# Patient Record
Sex: MALE | Race: BLACK OR AFRICAN AMERICAN | Employment: FULL TIME | URBAN - METROPOLITAN AREA
[De-identification: names, ages, dates, MRNs, and addresses within clinical notes are randomized per-mention and may not be internally consistent; named-entity substitution may affect disease eponyms.]

---

## 2024-07-28 ENCOUNTER — HOSPITAL ENCOUNTER (EMERGENCY)
Age: 39
Discharge: HOME | End: 2024-07-28
Attending: EMERGENCY MEDICINE

## 2024-07-28 VITALS
WEIGHT: 235 LBS | HEART RATE: 75 BPM | OXYGEN SATURATION: 99 % | TEMPERATURE: 98.2 F | DIASTOLIC BLOOD PRESSURE: 96 MMHG | RESPIRATION RATE: 16 BRPM | SYSTOLIC BLOOD PRESSURE: 145 MMHG | HEIGHT: 77 IN | BODY MASS INDEX: 27.75 KG/M2

## 2024-07-28 DIAGNOSIS — K04.7 DENTAL INFECTION: Primary | ICD-10-CM

## 2024-07-28 DIAGNOSIS — K08.89 TOOTHACHE: ICD-10-CM

## 2024-07-28 PROCEDURE — 6370000000 HC RX 637 (ALT 250 FOR IP): Performed by: EMERGENCY MEDICINE

## 2024-07-28 PROCEDURE — 99283 EMERGENCY DEPT VISIT LOW MDM: CPT

## 2024-07-28 RX ORDER — AMOXICILLIN 500 MG/1
500 CAPSULE ORAL 3 TIMES DAILY
Qty: 30 CAPSULE | Refills: 0 | Status: SHIPPED | OUTPATIENT
Start: 2024-07-28 | End: 2024-08-07

## 2024-07-28 RX ORDER — AMOXICILLIN 500 MG/1
500 CAPSULE ORAL
Status: COMPLETED | OUTPATIENT
Start: 2024-07-28 | End: 2024-07-28

## 2024-07-28 RX ORDER — HYDROCODONE BITARTRATE AND ACETAMINOPHEN 5; 325 MG/1; MG/1
1 TABLET ORAL EVERY 6 HOURS PRN
Qty: 12 TABLET | Refills: 0 | Status: SHIPPED | OUTPATIENT
Start: 2024-07-28 | End: 2024-07-31

## 2024-07-28 RX ADMIN — AMOXICILLIN 500 MG: 500 CAPSULE ORAL at 21:53

## 2024-07-28 ASSESSMENT — PAIN DESCRIPTION - ORIENTATION: ORIENTATION: LEFT

## 2024-07-28 ASSESSMENT — PAIN - FUNCTIONAL ASSESSMENT: PAIN_FUNCTIONAL_ASSESSMENT: 0-10

## 2024-07-28 ASSESSMENT — PAIN SCALES - GENERAL: PAINLEVEL_OUTOF10: 10

## 2024-07-28 ASSESSMENT — PAIN DESCRIPTION - LOCATION: LOCATION: TEETH

## 2024-07-29 NOTE — ED PROVIDER NOTES
Emergency Department Provider Note       PCP: No primary care provider on file.   Age: 38 y.o.   Sex: male     DISPOSITION Decision To Discharge 07/28/2024 09:49:48 PM       ICD-10-CM    1. Dental infection  K04.7 HYDROcodone-acetaminophen (NORCO) 5-325 MG per tablet      2. Toothache  K08.89 HYDROcodone-acetaminophen (NORCO) 5-325 MG per tablet          Medical Decision Making     38-year-old diabetic presents with pain to the left dentition and jaw starting 3 days ago.  No fever or chills.  Blood sugars under control at home.  Will be treated with amoxicillin as well as a short course of Norco as needed for pain.  Instructed follow-up with his dentist with a phone call in the morning.   1 acute, uncomplicated illness or injury.  Prescription drug management performed.    I independently ordered and reviewed each unique test.  I reviewed external records: Reviewed prescription monitoring report prior to prescribing narcotics.                     History     38-year-old male diabetic presents to the emergency room complaining of left dental pain starting 3 days ago.  Patient states the pain is progressively worsening, no help with ibuprofen.  States that sugar was up to 270 this morning, but throughout the day he has been treating with his insulin and last check it was 95 this evening.  He denies any fever or chills, nausea or vomiting.  Denies any increased urinary frequency or dysuria.  Pain is left-sided jaw and left lower teeth.    The history is provided by the patient and the spouse.     Physical Exam     Vitals signs and nursing note reviewed:  Vitals:    07/28/24 2120   BP: (!) 145/96   Pulse: 75   Resp: 16   Temp: 98.2 °F (36.8 °C)   TempSrc: Oral   SpO2: 99%   Weight: 106.6 kg (235 lb)   Height: 1.956 m (6' 5\")      Physical Exam  Vitals and nursing note reviewed.   Constitutional:       General: He is not in acute distress.  HENT:      Head: Normocephalic and atraumatic.      Right Ear: External ear

## 2024-07-29 NOTE — ED TRIAGE NOTES
Pt ambulatory with steady gait into triage. Pt c/o upper and lower left sided jaw/dental pain. Pt reports having known abscess on left lower jaw. Pt reports pain started 3 days ago and has progressively worsened. Pt has been taking 400mg ibuprofen. Last dose an hour ago. Pt reports needing dental referrals as well.

## 2024-07-29 NOTE — ED NOTES
Patient mobility status  with no difficulty. Provider aware     I have reviewed discharge instructions with the patient.  The patient verbalized understanding.    Patient left ED via Discharge Method: ambulatory to Home with Significant Other.    Opportunity for questions and clarification provided.     Patient given 2 scripts.